# Patient Record
Sex: FEMALE | Race: WHITE | NOT HISPANIC OR LATINO | Employment: UNEMPLOYED | URBAN - METROPOLITAN AREA
[De-identification: names, ages, dates, MRNs, and addresses within clinical notes are randomized per-mention and may not be internally consistent; named-entity substitution may affect disease eponyms.]

---

## 2017-10-12 ENCOUNTER — HOSPITAL ENCOUNTER (EMERGENCY)
Facility: MEDICAL CENTER | Age: 42
End: 2017-10-12
Attending: EMERGENCY MEDICINE
Payer: COMMERCIAL

## 2017-10-12 VITALS
HEART RATE: 106 BPM | RESPIRATION RATE: 14 BRPM | TEMPERATURE: 99 F | WEIGHT: 206.79 LBS | DIASTOLIC BLOOD PRESSURE: 106 MMHG | OXYGEN SATURATION: 96 % | HEIGHT: 64 IN | BODY MASS INDEX: 35.3 KG/M2 | SYSTOLIC BLOOD PRESSURE: 148 MMHG

## 2017-10-12 DIAGNOSIS — K04.7 DENTAL ABSCESS: ICD-10-CM

## 2017-10-12 DIAGNOSIS — F41.8 SITUATIONAL ANXIETY: ICD-10-CM

## 2017-10-12 DIAGNOSIS — K04.7 DENTAL INFECTION: ICD-10-CM

## 2017-10-12 PROCEDURE — A9270 NON-COVERED ITEM OR SERVICE: HCPCS | Performed by: EMERGENCY MEDICINE

## 2017-10-12 PROCEDURE — 99284 EMERGENCY DEPT VISIT MOD MDM: CPT

## 2017-10-12 PROCEDURE — 700102 HCHG RX REV CODE 250 W/ 637 OVERRIDE(OP): Performed by: EMERGENCY MEDICINE

## 2017-10-12 RX ORDER — HYDROCODONE BITARTRATE AND ACETAMINOPHEN 5; 325 MG/1; MG/1
1-2 TABLET ORAL EVERY 6 HOURS PRN
Qty: 20 TAB | Refills: 0 | Status: SHIPPED | OUTPATIENT
Start: 2017-10-12

## 2017-10-12 RX ORDER — AMOXICILLIN 500 MG/1
500 CAPSULE ORAL 3 TIMES DAILY
Qty: 30 CAP | Refills: 0 | Status: SHIPPED | OUTPATIENT
Start: 2017-10-12

## 2017-10-12 RX ORDER — HYDROCODONE BITARTRATE AND ACETAMINOPHEN 5; 325 MG/1; MG/1
2 TABLET ORAL ONCE
Status: COMPLETED | OUTPATIENT
Start: 2017-10-12 | End: 2017-10-12

## 2017-10-12 RX ORDER — AMOXICILLIN 500 MG/1
500 CAPSULE ORAL 3 TIMES DAILY
Qty: 30 CAP | Refills: 0 | Status: SHIPPED | OUTPATIENT
Start: 2017-10-12 | End: 2017-10-12

## 2017-10-12 RX ADMIN — HYDROCODONE BITARTRATE AND ACETAMINOPHEN 2 TABLET: 5; 325 TABLET ORAL at 12:53

## 2017-10-12 ASSESSMENT — PAIN SCALES - GENERAL
PAINLEVEL_OUTOF10: 7
PAINLEVEL_OUTOF10: 7

## 2017-10-12 ASSESSMENT — LIFESTYLE VARIABLES: DO YOU DRINK ALCOHOL: NO

## 2017-10-12 ASSESSMENT — ENCOUNTER SYMPTOMS
FEVER: 0
VOMITING: 0

## 2017-10-12 NOTE — ED NOTES
Pain noted to the lower left jaw concerns for a bad tooth.  Worse in the last couple of days.  Slight swelling noted to the lower left jaw.  No noted fevers.

## 2017-10-12 NOTE — DISCHARGE INSTRUCTIONS
Follow-up with her dentist.  Return to the ER for pain, swelling, fever or other concerns.  No driving on [place].    Abscessed Tooth  An abscessed tooth is an infection around your tooth. It may be caused by holes or damage to the tooth (cavity) or a dental disease. An abscessed tooth causes mild to very bad pain in and around the tooth. See your dentist right away if you have tooth or gum pain.  HOME CARE  · Take your medicine as told. Finish it even if you start to feel better.  · Do not drive after taking pain medicine.  · Rinse your mouth (gargle) often with salt water (¼ teaspoon salt in 8 ounces of warm water).  · Do not apply heat to the outside of your face.  GET HELP RIGHT AWAY IF:   · You have a temperature by mouth above 102° F (38.9° C), not controlled by medicine.  · You have chills and a very bad headache.  · You have problems breathing or swallowing.  · Your mouth will not open.  · You develop puffiness (swelling) on the neck or around the eye.  · Your pain is not helped by medicine.  · Your pain is getting worse instead of better.  MAKE SURE YOU:   · Understand these instructions.  · Will watch your condition.  · Will get help right away if you are not doing well or get worse.  Document Released: 06/05/2009 Document Revised: 03/11/2013 Document Reviewed: 03/27/2012  [employer]® Patient Information ©2014 [employer].  Please follow up with a primary physician for blood pressure management, diabetic screening, and all other preventive health concerns.

## 2017-10-12 NOTE — ED PROVIDER NOTES
ED Provider Note    Scribed for Fidel Quiroga M.D. by Yolanda Valles. 10/12/2017, 12:14 PM.    Primary care provider: None  Means of arrival: Walk in   History obtained from: Patient  History limited by: None    CHIEF COMPLAINT  Chief Complaint   Patient presents with   • Dental Pain       HPI  Marina Juarez is a 42 y.o. female who presents to the Emergency Department for dental pain with an onset of 2 weeks.  Symptoms developed two weeks ago with constant dental pain to her left lower jaw.  Pain is gradually worsening and is associated with swelling around her infected tooth and left facial swelling.  Negative for fever, vomiting, drooling, trismus or difficulty breathing.        REVIEW OF SYSTEMS  Review of Systems   Constitutional: Negative for fever.   HENT:        Positive for dental pain to the left lower jaw with swelling around infected tooth and left sided facial swelling. Negative for drooling or trismus.   Respiratory:        Negative for difficulty breathing.   Gastrointestinal: Negative for vomiting.     See HPI for further details. E.      PAST MEDICAL HISTORY  Patient has a past medical history of Headache(784.0); Headache, classical migraine; Hyperlipidemia; Hypertension; and Urinary tract infection, site not specified.      SURGICAL HISTORY  Patient has a past surgical history that includes other abdominal surgery; abdominal exploration; abdominal hysterectomy total; tonsillectomy and adenoidectomy; sinusotomies; appendectomy; dental extraction(s); incision and drainage general (Right, 8/12/2015); and dental extraction(s) (8/12/2015).      SOCIAL HISTORY  Social History   Substance Use Topics   • Smoking status: Current Every Day Smoker     Packs/day: 0.50     Years: 26.00     Types: Cigarettes   • Alcohol use No      History   Drug Use No       FAMILY HISTORY  Family History   Problem Relation Age of Onset   • Arthritis Mother      DDD   • Heart Disease Mother    • Hypertension Mother    •  "Hyperlipidemia Mother    • Lung Disease Mother      smokes   • Alcohol/Drug Mother      etoh   • Hypertension Father    • Hyperlipidemia Father    • Alcohol/Drug Father      etoh   • Lung Disease Father      smokes   • Cancer Maternal Aunt      breast   • Hypertension Paternal Aunt    • Hyperlipidemia Paternal Aunt    • Lung Disease Paternal Aunt      smoker   • Alcohol/Drug Paternal Aunt      etoh       CURRENT MEDICATIONS  Home Medications     Reviewed by Leah Morris R.N. (Registered Nurse) on 10/12/17 at 1228  Med List Status: Not Addressed   Medication Last Dose Status        Patient Luke Taking any Medications                       ALLERGIES  Allergies   Allergen Reactions   • Erythromycin Hives       PHYSICAL EXAM  VITAL SIGNS: /103   Pulse (!) 124   Temp 36.6 °C (97.8 °F)   Resp 20   Ht 1.626 m (5' 4\")   Wt 93.8 kg (206 lb 12.7 oz)   SpO2 95%   BMI 35.50 kg/m²     Vitals reviewed.    Constitutional: Well developed, Well nourished, No acute distress, Non-toxic appearance.   HENT: Normocephalic, Atraumatic, Bilateral external ears normal, Oropharynx moist, No oral exudates, Nose normal. Tooth #21 on left is broken and eroded to the gum line.  Erythema and swelling of gums.  Left facial swelling.  Eyes: PERRL, EOMI, Conjunctiva normal, No discharge.   Neck: Normal range of motion, No tenderness, Supple, No stridor.   Cardiovascular: Normal heart rate, Normal rhythm, No murmurs, No rubs,  Thorax & Lungs: Normal breath sounds, No respiratory distress, No wheezing,     Musculoskeletal: Good range of motion in all major joints.      COURSE & MEDICAL DECISION MAKING  Pertinent Labs & Imaging studies reviewed. (See chart for details)    12:30 PM Reviewed patient's electronic medical record which indicates an ED visit in 04/2016 for low back pain.    12:32 PM Patient seen and examined at bedside. Patient presents for dental pain.  Exam indicates a broken tooth on the left eroded to the gum line " "with swelling and erythema of the gums.      Initial treatment in the Emergency Department included two tablets of 5-325 mg of Norco PO for dental pain.      Discharge plan was discussed with the patient and includes following up with your physician.  Patient will be discharged with a prescription for Amoxil and Norco.       Reviewed the patient's prescription history on [place] Prescription Monitoring Program.  You have received medication in the emergency department that causes drowsiness. Do not drive, operate machinery, or perform any potentially dangerous tasks for least 12 hours and until you feel back to your normal baseline.    The patient will return for new or persisting symptoms including increased pain, facial swelling, drooling, trismus, difficulty breathing or any additional concerns.  The patient verbalizes understanding and will comply.  Patient is stable at the time of discharge.  Vital signs were reviewed: /103   Pulse (!) 124   Temp 36.6 °C (97.8 °F)   Resp 20   Ht 1.626 m (5' 4\")   Wt 93.8 kg (206 lb 12.7 oz)   SpO2 95%   BMI 35.50 kg/m²          DISPOSITION  Patient will be discharged home in stable condition.      FOLLOW UP  Your Physician  Varies    Schedule an appointment as soon as possible for a visit in 2 days        The patient is referred to a primary physician for blood pressure management, diabetic screening, and for all other preventative health concerns.      OUTPATIENT MEDICATIONS  New Prescriptions    AMOXICILLIN (AMOXIL) 500 MG CAP    Take 1 Cap by mouth 3 times a day.    HYDROCODONE-ACETAMINOPHEN (NORCO) 5-325 MG TAB PER TABLET    Take 1-2 Tabs by mouth every 6 hours as needed.       DIAGNOSIS  1. Dental infection    2. Dental abscess    3. Situational anxiety           The note accurately reflects work and decisions made by me.  Fidel Quiroga  10/12/2017  1:27 PM     Yolanda RANDALL (Scribe), am scribing for, and in the presence of, Fidel Quiroga, " M.D.    Electronically signed by: Yolanda Valles (Scribe), 10/12/2017    Fidel RANDALL M.D. personally performed the services described in this documentation, as scribed by Yolanda Valles in my presence, and it is both accurate and complete.